# Patient Record
Sex: FEMALE | Race: WHITE | Employment: FULL TIME | URBAN - METROPOLITAN AREA
[De-identification: names, ages, dates, MRNs, and addresses within clinical notes are randomized per-mention and may not be internally consistent; named-entity substitution may affect disease eponyms.]

---

## 2017-06-08 ENCOUNTER — APPOINTMENT (OUTPATIENT)
Dept: GENERAL RADIOLOGY | Age: 37
End: 2017-06-08
Attending: NURSE PRACTITIONER
Payer: SELF-PAY

## 2017-06-08 ENCOUNTER — HOSPITAL ENCOUNTER (EMERGENCY)
Age: 37
Discharge: HOME | End: 2017-06-08
Attending: EMERGENCY MEDICINE
Payer: SELF-PAY

## 2017-06-08 VITALS
WEIGHT: 293 LBS | BODY MASS INDEX: 51.91 KG/M2 | DIASTOLIC BLOOD PRESSURE: 76 MMHG | RESPIRATION RATE: 15 BRPM | HEART RATE: 73 BPM | HEIGHT: 63 IN | TEMPERATURE: 98.1 F | OXYGEN SATURATION: 97 % | SYSTOLIC BLOOD PRESSURE: 125 MMHG

## 2017-06-08 DIAGNOSIS — R03.0 ELEVATED BLOOD-PRESSURE READING, WITHOUT DIAGNOSIS OF HYPERTENSION: Primary | ICD-10-CM

## 2017-06-08 LAB
ALBUMIN SERPL BCP-MCNC: 3.7 G/DL (ref 3.5–5)
ALBUMIN/GLOB SERPL: 0.8 {RATIO} (ref 1.2–3.5)
ALP SERPL-CCNC: 75 U/L (ref 50–136)
ALT SERPL-CCNC: 24 U/L (ref 12–65)
ANION GAP BLD CALC-SCNC: 11 MMOL/L (ref 7–16)
AST SERPL W P-5'-P-CCNC: 21 U/L (ref 15–37)
BASOPHILS # BLD AUTO: 0 K/UL (ref 0–0.2)
BASOPHILS # BLD: 0 % (ref 0–2)
BILIRUB SERPL-MCNC: 0.3 MG/DL (ref 0.2–1.1)
BUN SERPL-MCNC: 12 MG/DL (ref 6–23)
CALCIUM SERPL-MCNC: 9.2 MG/DL (ref 8.3–10.4)
CHLORIDE SERPL-SCNC: 103 MMOL/L (ref 98–107)
CO2 SERPL-SCNC: 29 MMOL/L (ref 21–32)
CREAT SERPL-MCNC: 0.84 MG/DL (ref 0.6–1)
DIFFERENTIAL METHOD BLD: ABNORMAL
EOSINOPHIL # BLD: 0.2 K/UL (ref 0–0.8)
EOSINOPHIL NFR BLD: 2 % (ref 0.5–7.8)
ERYTHROCYTE [DISTWIDTH] IN BLOOD BY AUTOMATED COUNT: 14.4 % (ref 11.9–14.6)
GLOBULIN SER CALC-MCNC: 4.7 G/DL (ref 2.3–3.5)
GLUCOSE SERPL-MCNC: 102 MG/DL (ref 65–100)
HCT VFR BLD AUTO: 48.1 % (ref 35.8–46.3)
HGB BLD-MCNC: 15.5 G/DL (ref 11.7–15.4)
IMM GRANULOCYTES # BLD: 0 K/UL (ref 0–0.5)
IMM GRANULOCYTES NFR BLD AUTO: 0.2 % (ref 0–5)
LYMPHOCYTES # BLD AUTO: 26 % (ref 13–44)
LYMPHOCYTES # BLD: 3.4 K/UL (ref 0.5–4.6)
MCH RBC QN AUTO: 28.4 PG (ref 26.1–32.9)
MCHC RBC AUTO-ENTMCNC: 32.2 G/DL (ref 31.4–35)
MCV RBC AUTO: 88.1 FL (ref 79.6–97.8)
MONOCYTES # BLD: 0.6 K/UL (ref 0.1–1.3)
MONOCYTES NFR BLD AUTO: 5 % (ref 4–12)
NEUTS SEG # BLD: 8.8 K/UL (ref 1.7–8.2)
NEUTS SEG NFR BLD AUTO: 67 % (ref 43–78)
PLATELET # BLD AUTO: 243 K/UL (ref 150–450)
PMV BLD AUTO: 12.1 FL (ref 10.8–14.1)
POTASSIUM SERPL-SCNC: 4.1 MMOL/L (ref 3.5–5.1)
PROT SERPL-MCNC: 8.4 G/DL (ref 6.3–8.2)
RBC # BLD AUTO: 5.46 M/UL (ref 4.05–5.25)
SODIUM SERPL-SCNC: 143 MMOL/L (ref 136–145)
WBC # BLD AUTO: 13.1 K/UL (ref 4.3–11.1)

## 2017-06-08 PROCEDURE — 85025 COMPLETE CBC W/AUTO DIFF WBC: CPT | Performed by: NURSE PRACTITIONER

## 2017-06-08 PROCEDURE — 81003 URINALYSIS AUTO W/O SCOPE: CPT | Performed by: NURSE PRACTITIONER

## 2017-06-08 PROCEDURE — 93005 ELECTROCARDIOGRAM TRACING: CPT | Performed by: NURSE PRACTITIONER

## 2017-06-08 PROCEDURE — 99284 EMERGENCY DEPT VISIT MOD MDM: CPT | Performed by: NURSE PRACTITIONER

## 2017-06-08 PROCEDURE — 80053 COMPREHEN METABOLIC PANEL: CPT | Performed by: NURSE PRACTITIONER

## 2017-06-08 PROCEDURE — 71010 XR CHEST PORT: CPT

## 2017-06-08 NOTE — LETTER
[facility] EMERGENCY DEPT 
[facility] [address] [place] [id number] 
[phone] Work/School Note Date: 6/8/2017 To Whom It May concern: 
 
Tatiana Lynch was seen and treated today in the emergency room by the following provider(s): 
Attending Provider: Avery Harvey MD 
Nurse Practitioner: LUIS ALBERTO Eddy. Tatiana Lynch may return to work on 6/9/17 . Sincerely, Darryle Bean, RN

## 2017-06-08 NOTE — DISCHARGE INSTRUCTIONS
Elevated Blood Pressure: Care Instructions  Your Care Instructions    Blood pressure is a measure of how hard the blood pushes against the walls of your arteries. It's normal for blood pressure to go up and down throughout the day. But if it stays up over time, you have high blood pressure. Two numbers tell you your blood pressure. The first number is the systolic pressure. It shows how hard the blood pushes when your heart is pumping. The second number is the diastolic pressure. It shows how hard the blood pushes between heartbeats, when your heart is relaxed and filling with blood. An ideal blood pressure in adults is less than 120/80 (say \"120 over 80\"). High blood pressure is 140/90 or higher. You have high blood pressure if your top number is 140 or higher or your bottom number is 90 or higher, or both. The main test for high blood pressure is simple, fast, and painless. To diagnose high blood pressure, your doctor will test your blood pressure at different times. After testing your blood pressure, your doctor may ask you to test it again when you are home. If you are diagnosed with high blood pressure, you can work with your doctor to make a long-term plan to manage it. Follow-up care is a key part of your treatment and safety. Be sure to make and go to all appointments, and call your doctor if you are having problems. It's also a good idea to know your test results and keep a list of the medicines you take. How can you care for yourself at home? · Do not smoke. Smoking increases your risk for heart attack and stroke. If you need help quitting, talk to your doctor about stop-smoking programs and medicines. These can increase your chances of quitting for good. · Stay at a healthy weight. · Try to limit how much sodium you eat to less than 2,300 milligrams (mg) a day. Your doctor may ask you to try to eat less than 1,500 mg a day. · Be physically active.  Get at least 30 minutes of exercise on most days of the week. Walking is a good choice. You also may want to do other activities, such as running, swimming, cycling, or playing tennis or team sports. · Avoid or limit alcohol. Talk to your doctor about whether you can drink any alcohol. · Eat plenty of fruits, vegetables, and low-fat dairy products. Eat less saturated and total fats. · Learn how to check your blood pressure at home. When should you call for help? Call your doctor now or seek immediate medical care if:  · Your blood pressure is much higher than normal (such as 180/110 or higher). · You think high blood pressure is causing symptoms such as:  ¨ Severe headache. ¨ Blurry vision. Watch closely for changes in your health, and be sure to contact your doctor if:  · You do not get better as expected. Where can you learn more? Go to http://arthur-nena.info/. Enter R448 in the search box to learn more about \"Elevated Blood Pressure: Care Instructions. \"  Current as of: October 19, 2016  Content Version: 11.2  © 7705-1726 [employer]. Care instructions adapted under license by [employer] (which disclaims liability or warranty for this information). If you have questions about a medical condition or this instruction, always ask your healthcare professional. [address] any warranty or liability for your use of this information.

## 2017-06-08 NOTE — PROGRESS NOTES
Visited with patient as no PCP listed in chart. Patient states does not have a PCP because she has no insurance but she will have insurance effective next month as she will be at her job at [employer] for a year. Discussed the importance of getting established with a PCP and provided patient with a list of PCPs. Notified patient to let me know if they would like me to make the appointment. Advised her that if she is going to make appointment herself to call now and make it for after her effective date as it can take that long to get in.

## 2017-06-08 NOTE — ED PROVIDER NOTES
HPI Comments: Patient presents with elevated blood pressure reading yesterday while she was at the grocery store. She states her blood pressure was systolic in the 459I. She states she has recently started having headaches but denies headache currently. She denies chest pain, dizziness, and changes to her vision. Patient is a 30 y.o. female presenting with hypertension. The history is provided by the patient. Hypertension    This is a new problem. The current episode started yesterday. The problem has been resolved. Pertinent negatives include no chest pain, no orthopnea, no palpitations, no PND, no anxiety, no confusion, no malaise/fatigue, no blurred vision, no tinnitus, no neck pain, no peripheral edema, no dizziness, no shortness of breath, no nausea and no vomiting. There are no associated agents to hypertension. Risk factors include smoking/tobacco exposure and obesity. History reviewed. No pertinent past medical history. History reviewed. No pertinent surgical history. History reviewed. No pertinent family history. Social History     Social History    Marital status:      Spouse name: N/A    Number of children: N/A    Years of education: N/A     Occupational History    Not on file. Social History Main Topics    Smoking status: Current Every Day Smoker     Packs/day: 0.50    Smokeless tobacco: Not on file    Alcohol use Yes    Drug use: No    Sexual activity: Not on file     Other Topics Concern    Not on file     Social History Narrative    No narrative on file         ALLERGIES: Review of patient's allergies indicates no known allergies. Review of Systems   Constitutional: Negative for chills, fever and malaise/fatigue. HENT: Negative for congestion and tinnitus. Eyes: Negative for blurred vision, photophobia and visual disturbance. Respiratory: Negative for cough and shortness of breath.     Cardiovascular: Negative for chest pain, palpitations, orthopnea and PND. Gastrointestinal: Negative for abdominal pain, nausea and vomiting. Musculoskeletal: Negative for neck pain. Neurological: Negative for dizziness, syncope and weakness. Psychiatric/Behavioral: Negative for confusion. Vitals:    06/08/17 1510 06/08/17 1613 06/08/17 1633   BP: 154/81 125/76    Pulse: 88 77 73   Resp: 16 19 15   Temp: 98.1 °F (36.7 °C)     SpO2: 98% 97% 97%   Weight: 145.2 kg (320 lb)     Height: 5' 3\" (1.6 m)              Physical Exam   Constitutional: She appears well-developed and well-nourished. No distress. Cardiovascular: Normal rate and regular rhythm. No murmur heard. Pulmonary/Chest: Effort normal and breath sounds normal. No respiratory distress. She has no wheezes. Abdominal: Soft. Bowel sounds are normal. She exhibits no distension. There is no tenderness. Musculoskeletal: Normal range of motion. Neurological: She is alert. No cranial nerve deficit or sensory deficit. GCS eye subscore is 4. GCS verbal subscore is 5. GCS motor subscore is 6. Skin: Skin is warm and dry. She is not diaphoretic. Psychiatric: She has a normal mood and affect. Her behavior is normal.   Nursing note and vitals reviewed. Recent Results (from the past 12 hour(s))   CBC WITH AUTOMATED DIFF    Collection Time: 06/08/17  3:55 PM   Result Value Ref Range    WBC 13.1 (H) 4.3 - 11.1 K/uL    RBC 5.46 (H) 4.05 - 5.25 M/uL    HGB 15.5 (H) 11.7 - 15.4 g/dL    HCT 48.1 (H) 35.8 - 46.3 %    MCV 88.1 79.6 - 97.8 FL    MCH 28.4 26.1 - 32.9 PG    MCHC 32.2 31.4 - 35.0 g/dL    RDW 14.4 11.9 - 14.6 %    PLATELET 388 373 - 544 K/uL    MPV 12.1 10.8 - 14.1 FL    DF AUTOMATED      NEUTROPHILS 67 43 - 78 %    LYMPHOCYTES 26 13 - 44 %    MONOCYTES 5 4.0 - 12.0 %    EOSINOPHILS 2 0.5 - 7.8 %    BASOPHILS 0 0.0 - 2.0 %    IMMATURE GRANULOCYTES 0.2 0.0 - 5.0 %    ABS. NEUTROPHILS 8.8 (H) 1.7 - 8.2 K/UL    ABS. LYMPHOCYTES 3.4 0.5 - 4.6 K/UL    ABS. MONOCYTES 0.6 0.1 - 1.3 K/UL    ABS. EOSINOPHILS 0.2 0.0 - 0.8 K/UL    ABS. BASOPHILS 0.0 0.0 - 0.2 K/UL    ABS. IMM. GRANS. 0.0 0.0 - 0.5 K/UL   METABOLIC PANEL, COMPREHENSIVE    Collection Time: 06/08/17  3:55 PM   Result Value Ref Range    Sodium 143 136 - 145 mmol/L    Potassium 4.1 3.5 - 5.1 mmol/L    Chloride 103 98 - 107 mmol/L    CO2 29 21 - 32 mmol/L    Anion gap 11 7 - 16 mmol/L    Glucose 102 (H) 65 - 100 mg/dL    BUN 12 6 - 23 MG/DL    Creatinine 0.84 0.6 - 1.0 MG/DL    GFR est AA >60 >60 ml/min/1.73m2    GFR est non-AA >60 >60 ml/min/1.73m2    Calcium 9.2 8.3 - 10.4 MG/DL    Bilirubin, total 0.3 0.2 - 1.1 MG/DL    ALT (SGPT) 24 12 - 65 U/L    AST (SGOT) 21 15 - 37 U/L    Alk. phosphatase 75 50 - 136 U/L    Protein, total 8.4 (H) 6.3 - 8.2 g/dL    Albumin 3.7 3.5 - 5.0 g/dL    Globulin 4.7 (H) 2.3 - 3.5 g/dL    A-G Ratio 0.8 (L) 1.2 - 3.5       XR CHEST PORT (Final result) Result time: 06/08/17 15:55:46     Final result by Susan Mcmanus MD (06/08/17 15:55:46)     Impression:     Impression:  Nonspecific mild diffuse interstitial prominence.       Narrative:     AP chest radiograph    History: elevated blood pressure reading yesterday, 37 years Female elevated  blood pressure reading yesterday    Comparison: None available    Findings:   Normal cardiomediastinal silhouette.  Mild diffuse interstitial  prominence, nonspecific.  Mild subsegmental atelectasis bilateral lung bases. No evidence of pneumothorax, pleural effusion, or air space consolidation. Visualized soft tissue and osseous structures otherwise unremarkable.         MDM  Number of Diagnoses or Management Options  Elevated blood-pressure reading, without diagnosis of hypertension: new and requires workup  Diagnosis management comments: Patient noted to have elevated WBC and mild diffuse interstitial prominence. I believe results are related to the fact that she is a smoker. She denies cough, congestion,  fever, and urinary problems.  No other acute findings noted on lab work. ECG was reviewed with Dr. Zina Casarez. ECG NSR. Patient blood pressure within normal limits during visit. Patient encouraged to keep blood pressure log and to stop smoking. Encouraged her to follow up with [facility] or return to the Emergency Department for any further problems.         Amount and/or Complexity of Data Reviewed  Clinical lab tests: ordered and reviewed  Tests in the radiology section of CPT®: ordered and reviewed  Discuss the patient with other providers: yes    Patient Progress  Patient progress: stable    ED Course       Procedures

## 2017-06-08 NOTE — ED TRIAGE NOTES
Pt reports she checked her BP at the grocery store yesterday and it was 188/100. Pt states she does not take anything for BP, and does not have primary care doctor due to not having insurance until next month. Pt reports she has been having headaches.     Laurin Kawasaki, RN

## 2017-06-09 LAB
ATRIAL RATE: 73 BPM
CALCULATED P AXIS, ECG09: 56 DEGREES
CALCULATED R AXIS, ECG10: -18 DEGREES
CALCULATED T AXIS, ECG11: 34 DEGREES
DIAGNOSIS, 93000: NORMAL
P-R INTERVAL, ECG05: 168 MS
Q-T INTERVAL, ECG07: 368 MS
QRS DURATION, ECG06: 78 MS
QTC CALCULATION (BEZET), ECG08: 405 MS
VENTRICULAR RATE, ECG03: 73 BPM

## 2019-02-16 ENCOUNTER — HOSPITAL ENCOUNTER (EMERGENCY)
Age: 39
Discharge: HOME | End: 2019-02-16
Attending: EMERGENCY MEDICINE
Payer: SELF-PAY

## 2019-02-16 VITALS
RESPIRATION RATE: 18 BRPM | SYSTOLIC BLOOD PRESSURE: 150 MMHG | OXYGEN SATURATION: 97 % | WEIGHT: 293 LBS | DIASTOLIC BLOOD PRESSURE: 77 MMHG | HEIGHT: 63 IN | BODY MASS INDEX: 51.91 KG/M2 | HEART RATE: 81 BPM | TEMPERATURE: 98 F

## 2019-02-16 DIAGNOSIS — K52.9 GASTROENTERITIS, ACUTE: Primary | ICD-10-CM

## 2019-02-16 PROCEDURE — 99283 EMERGENCY DEPT VISIT LOW MDM: CPT | Performed by: EMERGENCY MEDICINE

## 2019-02-16 PROCEDURE — 74011250637 HC RX REV CODE- 250/637: Performed by: EMERGENCY MEDICINE

## 2019-02-16 RX ORDER — ONDANSETRON 8 MG/1
8 TABLET, ORALLY DISINTEGRATING ORAL
Status: COMPLETED | OUTPATIENT
Start: 2019-02-16 | End: 2019-02-16

## 2019-02-16 RX ORDER — ONDANSETRON 8 MG/1
8 TABLET, ORALLY DISINTEGRATING ORAL
Qty: 12 TAB | Refills: 0 | Status: SHIPPED | OUTPATIENT
Start: 2019-02-16

## 2019-02-16 RX ADMIN — ONDANSETRON 8 MG: 8 TABLET, ORALLY DISINTEGRATING ORAL at 08:46

## 2019-02-16 NOTE — DISCHARGE INSTRUCTIONS
Patient Education        Gastroenteritis: Care Instructions  Your Care Instructions    Gastroenteritis is an illness that may cause nausea, vomiting, and diarrhea. It is sometimes called \"stomach flu. \" It can be caused by bacteria or a virus. You will probably begin to feel better in 1 to 2 days. In the meantime, get plenty of rest and make sure you do not become dehydrated. Dehydration occurs when your body loses too much fluid. Follow-up care is a key part of your treatment and safety. Be sure to make and go to all appointments, and call your doctor if you are having problems. It's also a good idea to know your test results and keep a list of the medicines you take. How can you care for yourself at home? · If your doctor prescribed antibiotics, take them as directed. Do not stop taking them just because you feel better. You need to take the full course of antibiotics. · Drink plenty of fluids to prevent dehydration, enough so that your urine is light yellow or clear like water. Choose water and other caffeine-free clear liquids until you feel better. If you have kidney, heart, or liver disease and have to limit fluids, talk with your doctor before you increase your fluid intake. · Drink fluids slowly, in frequent, small amounts, because drinking too much too fast can cause vomiting. · Begin eating mild foods, such as dry toast, yogurt, applesauce, bananas, and rice. Avoid spicy, hot, or high-fat foods, and do not drink alcohol or caffeine for a day or two. Do not drink milk or eat ice cream until you are feeling better. How to prevent gastroenteritis  · Keep hot foods hot and cold foods cold. · Do not eat meats, dressings, salads, or other foods that have been kept at room temperature for more than 2 hours. · Use a thermometer to check your refrigerator. It should be between 34°F and 40°F.  · Defrost meats in the refrigerator or microwave, not on the kitchen counter.   · Keep your hands and your kitchen clean. Wash your hands, cutting boards, and countertops with hot soapy water frequently. · Cook meat until it is well done. · Do not eat raw eggs or uncooked sauces made with raw eggs. · Do not take chances. If food looks or tastes spoiled, throw it out. When should you call for help? Call 911 anytime you think you may need emergency care. For example, call if:    · You vomit blood or what looks like coffee grounds.     · You passed out (lost consciousness).     · You pass maroon or very bloody stools.    Call your doctor now or seek immediate medical care if:    · You have severe belly pain.     · You have signs of needing more fluids. You have sunken eyes, a dry mouth, and pass only a little dark urine.     · You feel like you are going to faint.     · You have increased belly pain that does not go away in 1 to 2 days.     · You have new or increased nausea, or you are vomiting.     · You have a new or higher fever.     · Your stools are black and tarlike or have streaks of blood.    Watch closely for changes in your health, and be sure to contact your doctor if:    · You are dizzy or lightheaded.     · You urinate less than usual, or your urine is dark yellow or brown.     · You do not feel better with each day that goes by. Where can you learn more? Go to http://arthur-nena.info/. Enter N142 in the search box to learn more about \"Gastroenteritis: Care Instructions. \"  Current as of: July 30, 2018  Content Version: 11.9  © 5241-7688 [employer]. Care instructions adapted under license by [employer] (which disclaims liability or warranty for this information). If you have questions about a medical condition or this instruction, always ask your healthcare professional. [place] [address] any warranty or liability for your use of this information.

## 2019-02-16 NOTE — ED NOTES
I have reviewed discharge instructions with the patient. The patient verbalized understanding. Patient left ED via Discharge Method: ambulatory to Home with self. Opportunity for questions and clarification provided. Patient given 1 scripts. Albert Gonzalez RN To continue your aftercare when you leave the hospital, you may receive an automated call from our care team to check in on how you are doing. This is a free service and part of our promise to provide the best care and service to meet your aftercare needs.  If you have questions, or wish to unsubscribe from this service please call [phone]. Thank you for Choosing our [employer] [facility] Emergency Department.

## 2019-02-16 NOTE — LETTER
[facility] EMERGENCY DEPT 
[address] [place] [id number] 
[phone] Work/School Note Date: 2/16/2019 To Whom It May concern: 
 
Genoveva Russell was seen and treated today in the emergency room by the following provider(s): 
Attending Provider: Cassi Bal MD.   
 
Genoveva Russell may return to work on 2/18/19. Sincerely, Jayne Wallis MD

## 2019-02-16 NOTE — ED PROVIDER NOTES
Patient presents to the  Emergency department with 2 day history of nausea, vomiting, diarrhea. Son with similar symptoms. Denies fever, chills, or URI symptoms. Denies abdominal pain. The history is provided by the patient. Vomiting This is a new problem. The current episode started 2 days ago. The problem occurs 2 to 4 times per day. The problem has not changed since onset. The emesis has an appearance of stomach contents and clear. There has been no fever. Associated symptoms include diarrhea (mild). Pertinent negatives include no chills, no fever, no sweats, no abdominal pain, no headaches, no arthralgias, no myalgias, no cough, no URI and no headaches. The patient is not pregnant. Risk factors include ill contacts. Her pertinent negatives include no irritable bowel syndrome, no inflammatory bowel disease, no short gut syndrome, no bowel resection, no recent abdominal surgery, no malabsorption, no gastric bypass and no DM. History reviewed. No pertinent past medical history. History reviewed. No pertinent surgical history. History reviewed. No pertinent family history. Social History Socioeconomic History  Marital status:  Spouse name: Not on file  Number of children: Not on file  Years of education: Not on file  Highest education level: Not on file Social Needs  Financial resource strain: Not on file  Food insecurity - worry: Not on file  Food insecurity - inability: Not on file  Transportation needs - medical: Not on file  Transportation needs - non-medical: Not on file Occupational History  Not on file Tobacco Use  Smoking status: Current Every Day Smoker Packs/day: 0.50 Substance and Sexual Activity  Alcohol use: Yes  Drug use: No  
 Sexual activity: Not on file Other Topics Concern  Not on file Social History Narrative  Not on file ALLERGIES: Patient has no known allergies. Review of Systems Constitutional: Negative for chills and fever. Respiratory: Negative for cough. Gastrointestinal: Positive for diarrhea (mild), nausea and vomiting. Negative for abdominal pain and constipation. Musculoskeletal: Negative for arthralgias and myalgias. Neurological: Negative for headaches. All other systems reviewed and are negative. Vitals:  
 02/16/19 0744 BP: (!) 144/99 Pulse: 86 Resp: 16 Temp: 98.2 °F (36.8 °C) SpO2: 97% Weight: (!) 160.6 kg (354 lb) Height: 5' 3\" (1.6 m) Physical Exam  
Constitutional: She is oriented to person, place, and time. She appears well-developed and well-nourished. No distress. HENT:  
Head: Normocephalic and atraumatic. Right Ear: External ear normal.  
Left Ear: External ear normal.  
Mouth/Throat: Oropharynx is clear and moist.  
Eyes: Conjunctivae and EOM are normal. Pupils are equal, round, and reactive to light. Neck: Normal range of motion. Neck supple. No thyromegaly present. Cardiovascular: Normal rate, regular rhythm, normal heart sounds and intact distal pulses. Pulmonary/Chest: Effort normal and breath sounds normal.  
Abdominal: Soft. Bowel sounds are normal. She exhibits no distension and no mass. There is no tenderness. There is no rebound and no guarding. No hernia. Musculoskeletal: Normal range of motion. She exhibits no edema. Neurological: She is alert and oriented to person, place, and time. Skin: Skin is warm and dry. Capillary refill takes less than 2 seconds. Psychiatric: She has a normal mood and affect. Nursing note and vitals reviewed. MDM Number of Diagnoses or Management Options Gastroenteritis, acute: new and does not require workup Amount and/or Complexity of Data Reviewed Review and summarize past medical records: yes Risk of Complications, Morbidity, and/or Mortality Presenting problems: low Diagnostic procedures: minimal 
Management options: low Patient Progress Patient progress: stable Procedures

## 2019-03-26 ENCOUNTER — APPOINTMENT (OUTPATIENT)
Dept: GENERAL RADIOLOGY | Age: 39
End: 2019-03-26
Attending: EMERGENCY MEDICINE
Payer: SELF-PAY

## 2019-03-26 ENCOUNTER — HOSPITAL ENCOUNTER (EMERGENCY)
Age: 39
Discharge: HOME | End: 2019-03-26
Attending: EMERGENCY MEDICINE
Payer: SELF-PAY

## 2019-03-26 VITALS
SYSTOLIC BLOOD PRESSURE: 131 MMHG | TEMPERATURE: 98.3 F | DIASTOLIC BLOOD PRESSURE: 78 MMHG | RESPIRATION RATE: 18 BRPM | HEART RATE: 91 BPM | OXYGEN SATURATION: 96 %

## 2019-03-26 DIAGNOSIS — R05.9 COUGH: Primary | ICD-10-CM

## 2019-03-26 DIAGNOSIS — J18.9 LINGULAR PNEUMONIA: ICD-10-CM

## 2019-03-26 PROCEDURE — 71046 X-RAY EXAM CHEST 2 VIEWS: CPT

## 2019-03-26 PROCEDURE — 77030013140 HC MSK NEB VYRM -A

## 2019-03-26 PROCEDURE — 94640 AIRWAY INHALATION TREATMENT: CPT

## 2019-03-26 PROCEDURE — 74011000250 HC RX REV CODE- 250: Performed by: EMERGENCY MEDICINE

## 2019-03-26 PROCEDURE — 99283 EMERGENCY DEPT VISIT LOW MDM: CPT | Performed by: EMERGENCY MEDICINE

## 2019-03-26 RX ORDER — ALBUTEROL SULFATE 90 UG/1
2 AEROSOL, METERED RESPIRATORY (INHALATION)
Qty: 1 INHALER | Refills: 0 | Status: SHIPPED | OUTPATIENT
Start: 2019-03-26

## 2019-03-26 RX ORDER — IPRATROPIUM BROMIDE AND ALBUTEROL SULFATE 2.5; .5 MG/3ML; MG/3ML
3 SOLUTION RESPIRATORY (INHALATION)
Status: COMPLETED | OUTPATIENT
Start: 2019-03-26 | End: 2019-03-26

## 2019-03-26 RX ORDER — DOXYCYCLINE HYCLATE 100 MG
100 TABLET ORAL 2 TIMES DAILY
Qty: 14 TAB | Refills: 0 | Status: SHIPPED | OUTPATIENT
Start: 2019-03-26 | End: 2019-03-31

## 2019-03-26 RX ORDER — PREDNISONE 20 MG/1
40 TABLET ORAL DAILY
Qty: 8 TAB | Refills: 0 | Status: SHIPPED | OUTPATIENT
Start: 2019-03-26 | End: 2019-03-30

## 2019-03-26 RX ADMIN — IPRATROPIUM BROMIDE AND ALBUTEROL SULFATE 3 ML: .5; 3 SOLUTION RESPIRATORY (INHALATION) at 08:40

## 2019-03-26 NOTE — ED PROVIDER NOTES
[facility] Emergency Department Arrival Date/Time: 3/26/2019  7:25 AM   
 
Candis Mejía  MRN: [id number] YOB: 1980   41 y.o. female [facility] EMERGENCY DEPT FT09/09  Seen on 3/26/2019 @ 8:26 AM   
Chief Complaint Patient presents with  Cough HPI: Very pleasant female presents to the emergency department several days of cough and shortness of breath. Nonproductive No fever. No alleviating. Worse with activity HPI Review of Systems: Review of Systems Constitutional: Negative. Respiratory: Positive for cough, shortness of breath and wheezing. Cardiovascular: Negative. Musculoskeletal: Negative. Psychiatric/Behavioral: Negative. Past Medical History: Primary Care Doctor: None History reviewed. No pertinent past medical history. Past Surgical History:  
Procedure Laterality Date  HX ORTHOPAEDIC Left Social History Socioeconomic History  Marital status:  Spouse name: Not on file  Number of children: Not on file  Years of education: Not on file  Highest education level: Not on file Tobacco Use  Smoking status: Current Every Day Smoker Packs/day: 0.50  Smokeless tobacco: Current User Substance and Sexual Activity  Alcohol use: Yes  Drug use: No  
 
Prior to Admission Medications Prescriptions Last Dose Informant Patient Reported? Taking?  
ondansetron (ZOFRAN ODT) 8 mg disintegrating tablet Not Taking at Unknown time  No No  
Sig: Take 1 Tab by mouth every eight (8) hours as needed for Nausea. Facility-Administered Medications: None No Known Allergies Physical Exam:  Nursing documentation reviewed. Vitals:  
 03/26/19 8506 BP: 133/88 Pulse: 97 Resp: 16 Temp: 98.1 °F (36.7 °C) SpO2: 95% Vital signs were reviewed. Physical Exam  
Constitutional: She is oriented to person, place, and time. She appears well-developed and well-nourished.   
HENT:  
 Head: Normocephalic and atraumatic. Cardiovascular: Normal rate and regular rhythm. Pulmonary/Chest: She has wheezes. Neurological: She is alert and oriented to person, place, and time. Skin: Skin is warm and dry. Psychiatric: She has a normal mood and affect. Nursing note and vitals reviewed. MEDICAL DECISION MAKING: 
MDM Number of Diagnoses or Management Options Cough:  
Lingular pneumonia:  
Diagnosis management comments: 82-year-old female with cough wheezing Chest x-ray is obtained. Give her breathing treatment. Amount and/or Complexity of Data Reviewed Tests in the radiology section of CPT®: ordered and reviewed Risk of Complications, Morbidity, and/or Mortality Presenting problems: moderate Diagnostic procedures: minimal 
Management options: moderate ED Evaluation Labs:  No results found for this or any previous visit (from the past 24 hour(s)). Radiology studies performed: XR CHEST PA LAT Final Result IMPRESSION:   
1. Mild opacity in the left lower lung field favored to reside in the lingula  
for which an early infiltrate such as pneumonia cannot be excluded. No dense  
consolidation or associated pleural effusion is seen. Visualized by me Orders Placed This Encounter  albuterol-ipratropium (DUO-NEB) 2.5 MG-0.5 MG/3 ML  
 
________________________________________________________ Procedure Documentation: Procedures 
________________________________________________________ ASSESSMENT: Pleasant 82-year-old female with cough shortness of breath presently getting worse Chest x-ray suggests an early lingular infiltrate. Start her on antibiotics and inhaler. Discharge home to follow up with her primary care physician. Dispo/Plan:    Home Condition:  Good Diagnosis: 1. Cough 2. Lingular pneumonia Lauri Carpio MD; 3/26/2019 @8:26 AM ================================ 
 
ED Course:

## 2019-03-26 NOTE — ED NOTES
I have reviewed discharge instructions with the patient. The patient verbalized understanding. Patient left ED via Discharge Method: ambulatory to Home with self. Opportunity for questions and clarification provided. Patient given 3 scripts. To continue your aftercare when you leave the hospital, you may receive an automated call from our care team to check in on how you are doing. This is a free service and part of our promise to provide the best care and service to meet your aftercare needs.  If you have questions, or wish to unsubscribe from this service please call [phone]. Thank you for Choosing our [facility] Emergency Department.

## 2019-03-26 NOTE — LETTER
[facility] EMERGENCY DEPT 
[address] [place] [id number] [phone] Work/School Note Date: 3/26/2019 To Whom It May concern: 
 
Kaitlin Maldonado was seen and treated today in the emergency room by the following provider(s): 
Attending Provider: Frantz Kenney MD.   
 
Kaitlin Maldonado may return to work on 03/28/2019.  
 
Sincerely, 
 
 
 
 
Francisco J Vincent RN

## 2019-03-31 ENCOUNTER — HOSPITAL ENCOUNTER (EMERGENCY)
Age: 39
Discharge: HOME | End: 2019-03-31
Attending: EMERGENCY MEDICINE
Payer: SELF-PAY

## 2019-03-31 ENCOUNTER — APPOINTMENT (OUTPATIENT)
Dept: GENERAL RADIOLOGY | Age: 39
End: 2019-03-31
Attending: EMERGENCY MEDICINE
Payer: SELF-PAY

## 2019-03-31 VITALS
DIASTOLIC BLOOD PRESSURE: 90 MMHG | SYSTOLIC BLOOD PRESSURE: 172 MMHG | TEMPERATURE: 98.6 F | HEART RATE: 100 BPM | OXYGEN SATURATION: 97 % | RESPIRATION RATE: 18 BRPM

## 2019-03-31 DIAGNOSIS — J20.9 ACUTE BRONCHITIS, UNSPECIFIED ORGANISM: Primary | ICD-10-CM

## 2019-03-31 LAB
ANION GAP SERPL CALC-SCNC: 9 MMOL/L
BUN SERPL-MCNC: 13 MG/DL (ref 6–23)
CALCIUM SERPL-MCNC: 9.2 MG/DL (ref 8.3–10.4)
CHLORIDE SERPL-SCNC: 104 MMOL/L (ref 98–107)
CO2 SERPL-SCNC: 27 MMOL/L (ref 21–32)
CREAT SERPL-MCNC: 0.81 MG/DL (ref 0.6–1)
ERYTHROCYTE [DISTWIDTH] IN BLOOD BY AUTOMATED COUNT: 13 % (ref 11.9–14.6)
GLUCOSE SERPL-MCNC: 181 MG/DL (ref 65–100)
HCT VFR BLD AUTO: 51.3 % (ref 35.8–46.3)
HGB BLD-MCNC: 16.1 G/DL (ref 11.7–15.4)
MCH RBC QN AUTO: 28 PG (ref 26.1–32.9)
MCHC RBC AUTO-ENTMCNC: 31.4 G/DL (ref 31.4–35)
MCV RBC AUTO: 89.4 FL (ref 79.6–97.8)
NRBC # BLD: 0 K/UL (ref 0–0.2)
PLATELET # BLD AUTO: 224 K/UL (ref 150–450)
PMV BLD AUTO: 11.1 FL (ref 9.4–12.3)
POTASSIUM SERPL-SCNC: 3.5 MMOL/L (ref 3.5–5.1)
RBC # BLD AUTO: 5.74 M/UL (ref 4.05–5.2)
SODIUM SERPL-SCNC: 140 MMOL/L (ref 136–145)
WBC # BLD AUTO: 9.9 K/UL (ref 4.3–11.1)

## 2019-03-31 PROCEDURE — 94664 DEMO&/EVAL PT USE INHALER: CPT

## 2019-03-31 PROCEDURE — 96360 HYDRATION IV INFUSION INIT: CPT | Performed by: EMERGENCY MEDICINE

## 2019-03-31 PROCEDURE — 71046 X-RAY EXAM CHEST 2 VIEWS: CPT

## 2019-03-31 PROCEDURE — 99283 EMERGENCY DEPT VISIT LOW MDM: CPT | Performed by: EMERGENCY MEDICINE

## 2019-03-31 PROCEDURE — 80048 BASIC METABOLIC PNL TOTAL CA: CPT

## 2019-03-31 PROCEDURE — 74011250636 HC RX REV CODE- 250/636: Performed by: EMERGENCY MEDICINE

## 2019-03-31 PROCEDURE — 74011250637 HC RX REV CODE- 250/637: Performed by: EMERGENCY MEDICINE

## 2019-03-31 PROCEDURE — 85027 COMPLETE CBC AUTOMATED: CPT

## 2019-03-31 PROCEDURE — 94640 AIRWAY INHALATION TREATMENT: CPT

## 2019-03-31 PROCEDURE — 74011000250 HC RX REV CODE- 250: Performed by: EMERGENCY MEDICINE

## 2019-03-31 RX ORDER — AMOXICILLIN 500 MG/1
500 TABLET, FILM COATED ORAL 3 TIMES DAILY
Qty: 21 TAB | Refills: 0 | Status: SHIPPED | OUTPATIENT
Start: 2019-03-31 | End: 2019-04-07

## 2019-03-31 RX ORDER — BENZONATATE 100 MG/1
100-200 CAPSULE ORAL
Qty: 14 CAP | Refills: 0 | Status: SHIPPED | OUTPATIENT
Start: 2019-03-31 | End: 2019-04-07

## 2019-03-31 RX ORDER — BENZONATATE 100 MG/1
200 CAPSULE ORAL
Status: COMPLETED | OUTPATIENT
Start: 2019-03-31 | End: 2019-03-31

## 2019-03-31 RX ORDER — ALBUTEROL SULFATE 0.83 MG/ML
5 SOLUTION RESPIRATORY (INHALATION)
Status: COMPLETED | OUTPATIENT
Start: 2019-03-31 | End: 2019-03-31

## 2019-03-31 RX ADMIN — ALBUTEROL SULFATE 5 MG: 2.5 SOLUTION RESPIRATORY (INHALATION) at 18:37

## 2019-03-31 RX ADMIN — BENZONATATE 200 MG: 100 CAPSULE ORAL at 18:36

## 2019-03-31 RX ADMIN — SODIUM CHLORIDE 1000 ML: 900 INJECTION, SOLUTION INTRAVENOUS at 18:36

## 2019-03-31 NOTE — ED PROVIDER NOTES
72-year-old lady presents with concerns about a continued cough and shortness of breath the last couple of days. She says she was seen here a few days ago and had an x-ray that showed some possible infection. She says she was given a prescription for antibiotics but was not able to get them filled. Review the record indicates a prescription for doxycycline. Patient says her cough is persistent and a little worse than it was before. She said that she feels tight in the lungs but does not have any actual chest pain or pressure. She does smoke. Elements of this note were created using speech recognition software. As such, errors of speech recognition may be present. History reviewed. No pertinent past medical history. Past Surgical History:  
Procedure Laterality Date  HX ORTHOPAEDIC Left History reviewed. No pertinent family history. Social History Socioeconomic History  Marital status:  Spouse name: Not on file  Number of children: Not on file  Years of education: Not on file  Highest education level: Not on file Occupational History  Not on file Social Needs  Financial resource strain: Not on file  Food insecurity:  
  Worry: Not on file Inability: Not on file  Transportation needs:  
  Medical: Not on file Non-medical: Not on file Tobacco Use  Smoking status: Current Every Day Smoker Packs/day: 0.50  Smokeless tobacco: Current User Substance and Sexual Activity  Alcohol use: Yes  Drug use: No  
 Sexual activity: Not on file Lifestyle  Physical activity:  
  Days per week: Not on file Minutes per session: Not on file  Stress: Not on file Relationships  Social connections:  
  Talks on phone: Not on file Gets together: Not on file Attends Sabianist service: Not on file Active member of club or organization: Not on file Attends meetings of clubs or organizations: Not on file Relationship status: Not on file  Intimate partner violence:  
  Fear of current or ex partner: Not on file Emotionally abused: Not on file Physically abused: Not on file Forced sexual activity: Not on file Other Topics Concern  Not on file Social History Narrative  Not on file ALLERGIES: Patient has no known allergies. Review of Systems Constitutional: Positive for chills. Negative for fever. HENT: Negative for congestion, postnasal drip and rhinorrhea. Respiratory: Positive for cough, chest tightness, shortness of breath and wheezing. Cardiovascular: Negative for chest pain and palpitations. Gastrointestinal: Negative for diarrhea, nausea and vomiting. Vitals:  
 03/31/19 1751 BP: (!) 177/116 Pulse: (!) 110 Resp: 18 Temp: 98.6 °F (37 °C) SpO2: 95% Physical Exam  
Constitutional: She is oriented to person, place, and time. She appears well-developed and well-nourished. Cardiovascular: Regular rhythm and normal heart sounds. Patient's heart rate was ninety on my exam.  I suspect her triage tachycardia is related to her body habitus and the walk in from outside Pulmonary/Chest: Effort normal.  
Faint scattered wheezes Neurological: She is alert and oriented to person, place, and time. Skin: Skin is warm and dry. Nursing note and vitals reviewed. MDM Number of Diagnoses or Management Options Diagnosis management comments: Given the x-ray findings from a few days ago of lower and x-ray as well as get some baseline blood work. I will also give her a liter of fluids due to the question will tachycardia. 6:30 PM 
Patient's repeat chest x-ray is negative. I suspect she has a likely bronchitis. I will switch her to different antibiotics since I think the doxycycline would be difficult for her to afford. Procedures

## 2019-03-31 NOTE — DISCHARGE INSTRUCTIONS
Return with any vomiting, fevers, difficulty breathing, worsening symptoms, or distal concerns. Follow up with your primary care doctor for reevaluation as needed.

## 2019-03-31 NOTE — ED NOTES
I have reviewed discharge instructions with the patient. The patient verbalized understanding. Patient left ED via Discharge Method: ambulatory to Home with sister. Opportunity for questions and clarification provided. Patient given 2 scripts. To continue your aftercare when you leave the hospital, you may receive an automated call from our care team to check in on how you are doing. This is a free service and part of our promise to provide the best care and service to meet your aftercare needs.  If you have questions, or wish to unsubscribe from this service please call [phone]. Thank you for Choosing our [facility] Emergency Department.

## 2019-03-31 NOTE — LETTER
[facility] EMERGENCY DEPT 
[facility] [address] 
[phone] Work/School Note Date: 3/31/2019 To Whom It May concern: 
 
Alvina Cummings was seen and treated today in the emergency room by the following provider(s): 
Attending Provider: Zoran Mahan MD.   
 
Alvina Cummings may return to work on 4/2/19. Sincerely, Damon Kendall RN

## 2019-03-31 NOTE — ED TRIAGE NOTES
Pt was seen here in the last few days for cold symptoms. She did not get her antibiotic filled and is back because she is not feeling better.